# Patient Record
(demographics unavailable — no encounter records)

---

## 2025-02-28 NOTE — HISTORY OF PRESENT ILLNESS
[FreeTextEntry1] : 57 YO M seen TODAY 2/28/2025 as NPT. He was formerly a patient of Dr. Hunter, seen 10/17/2023:         58 yo M with elevated PSA, positive family history of prostate cancer TRUS prostate biopsy 12/2018 negative MRI 2/2019 small < 1cm, PI-RADS 3 lesion (equivocal) [place] [facility] [place] MRI 7/2020, no suspicious lesions PSA remains stable. Repeat today BPH, ED, not satisfied with daily Cialis 5mg Will trial tamsulosin 0.4mg for BPH symptoms Reviewed side effects Recommend trial of sildenafil 100mg for ED Reviewed side effects F/u 3 months. PSA 4.37,  10/12/2022  He comes for "Prostate Issues".

## 2025-03-12 NOTE — HISTORY OF PRESENT ILLNESS
[FreeTextEntry1] : 59 YO M seen TODAY 3/12/2025 as NPT. He cancelled 2/28/2025. He was formerly a patient of Dr. Hunter, seen 10/17/2023:         56 YO M with elevated PSA, positive family history of prostate cancer TRUS prostate biopsy 12/2018 negative MRI 2/2019 small < 1cm, PI-RADS 3 lesion (equivocal) [place] [facility] [place] negative MRI 7/2020, no suspicious lesions PSA remains stable. Repeat today BPH, ED, not satisfied with daily Cialis 5mg Will trial tamsulosin 0.4mg for BPH symptoms Reviewed side effects Recommend trial of sildenafil 100mg for ED Reviewed side effects F/u 3 months. PSA 4.37,  10/12/2022  He comes for further evaluation of a recent higher PSA. He told me that he has had 2 prior biopsies, the last one was in 2020, a Fusion PNB through the rectum. both were negative. This was done after the MRI showed a IA=3 lesion in the left PZ.  He had a repeat MRI 10/4/2024 which showed a IA=3 lesion in the RIGHT PZ. Latest PSA from 12/3/2024 was 9.9.  He also has persistent LUTS and is not taking the tamsulosin as prescribed. He also has chronic ED with difficulty achieving and maintaining erections. He has had intermittent results on PRN sildenafil 100 mg or tadalafil 20 mg. He states that tadalafil 5 mg daily also was not as effective. He has not tried combination therapy.

## 2025-03-12 NOTE — HISTORY OF PRESENT ILLNESS
[FreeTextEntry1] : 57 YO M seen TODAY 3/12/2025 as NPT. He cancelled 2/28/2025. He was formerly a patient of Dr. Hunter, seen 10/17/2023:         58 YO M with elevated PSA, positive family history of prostate cancer TRUS prostate biopsy 12/2018 negative MRI 2/2019 small < 1cm, PI-RADS 3 lesion (equivocal) [place] [facility] [place] was negative MRI 7/2020, no suspicious lesions PSA remains stable. Repeat today BPH, ED, not satisfied with daily Cialis 5mg Will trial tamsulosin 0.4mg for BPH symptoms Reviewed side effects Recommend trial of sildenafil 100mg for ED Reviewed side effects F/u 3 months. PSA 4.37,  10/12/2022  He comes for further evaluation of a recent higher PSA. He told me that he has had 2 prior biopsies, the last one was in 2020, a Fusion PNB through the rectum. both were negative. This was done after the MRI showed a TX=3 lesion in the left PZ.  He had a repeat MRI 10/4/2024 which showed a TX=3 lesion in the RIGHT PZ. Latest PSA from 12/3/2024 was 9.9.  He also has persistent LUTS and is not taking the tamsulosin as prescribed. He also has chronic ED with difficulty achieving and maintaining erections. He has had intermittent results on PRN sildenafil 100 mg or tadalafil 20 mg. He states that tadalafil 5 mg daily also was not as effective. He has not tried combination therapy.

## 2025-03-19 NOTE — HISTORY OF PRESENT ILLNESS
[Home] : at home, [unfilled] , at the time of the visit. [Medical Office: ([facility])___] : at the medical office located in  [This encounter was initiated by telehealth (audio with video) and converted to telephone (audio only)] : This encounter was initiated by telehealth (audio with video) and converted to telephone (audio only) [Technical] : patient unable to effectively utilize tele-video due to technical issues. [Verbal consent obtained from patient] : the patient, [unfilled] [FreeTextEntry1] : Language: *** Date of First visit: *** Accompanied by: *** Contact info: *** Referring Provider/PCP:    Dr Mcmillan   CC/ Problem List:   =============================================================================== FIRST VISIT:  The patient's first visit with [facility] urology was on 2018 when the patient was 52 years old.  He was followed by Dr Hunter until his departure in . He then saw Dr Mcmillan. He has had an elevated/fluctuating PSA for years. He has had three MRIs. He has had two biopsies, both negative.    ------------------------------------------------------------------------------------------- INTERVAL VISITS: The patient presents as an RDP referral on 3/19/25 at 58 years old. Please note interval events and changes in PMH, PSH, MEDS and ALLERGIES were reviewed. His PSA is 7.22ng/ml. He has had three prostate MRIs. Most recent MRI demonstrated an 11mm PIRADS 3 lesion (right posterior midgland). His PSA density is elevated (0.18 ng/ml/cc). He has had two negative biopsies, both in 2019. One was with Dr Hunter, the 2nd was in 2019 at [facility] [place] and was fusion.  His father was diagnosed with prostate cancer in his 60s and passed away shortly after from the disease.  Experiencing moderate LUTS on daily tadalafil. He states he is satisfied with his symptoms on this medication.  His PVR at most recent visit was 61cc. IPSS 9 QOL 4    ===============================================================================   PMH: No chronic medical problems PSH: Cervical disc POBH: (if applicable) FH: Father metastatic prostate cancer, mother lung cancer   ALL: NKDA MEDS: Tadalafil 5mg SOC: Former smoker 1ppd [id number], quit 30 years ago      ROS: Review of Systems is as per HPI unless otherwise denoted below     =============================================================================== DATA:   LABS:------------------------------------------------------------------------------------------------------------------- 10/1/18: PSA 6.11 10/23/18: PSA 6.83 18: PSA 7.29 2020: PSA 6.91 2021: PSA 6.10 10/20/21: PSA 5.46 10/10/22: PSA 4.37 2024: 9.9 per pt 3/12/2025: PSA 7.22    RADS:------------------------------------------------------------------------------------------------------------------- 2/15/2019 MRI prostate at [facility] 29cc prostate PIRADS 3 9mm left mid pzpl No LAD EPE or bony lesions  2020 MRI prostate at [facility] 32cc prostate No suspicious lesions, PIRADS 2  10/4/2024 MRI prostate at [facility] 38cc prostate 11mm PIRADS 3 lesion, right posterior midgland No LAD EPE or bony lesions    PATHOLOGY/CYTOLOGY:------------------------------------------------------------------------------------------- 2019  - prostate biopsy with Dr Hunter  cores BPH     VOIDING STUDIES: ---------------------------------------------------------------------------------------------------- 3/12/25: PVR 61cc      STONE STUDIES: (Analysis/LLSA)----------------------------------------------------------------------------------       PROCEDURES: -----------------------------------------------------------------------------------------------         ===============================================================================     PHYSICAL EXAM:   GEN: AAOx3, NAD   BARRIERS to CARE: None   PSYCH: Appropriate Behavior, Affect Congruent   FOCUSED: -------------------------------------------------------------------------------------------------   Prostate: (date 3/12/25 with Dr Mcmillan)  Smooth. No nodules.  =======================================================================================       ASSESSMENT and PLAN   The patient is a 58 year old male with a history of the followin. Elevated PSA/PSAD - Need MRI images uploaded to PACS prior to his RDR visit- RDP team aware - He is hesitant to book a 3rd biopsy. We discussed his risk factors -- PSA/PSAD, lesion on MRI, and family hx. - Offered TP biopsy vs select MDx as a tie breaker. He knows this is a test for clinically significant prostate cancer. He would like to proceed with a select MDx before deciding on a biopsy.  Prostate cancer screening: the patient and I spoke at length about prostate cancer screening, its risks and its benefits.  He  understands that PSA in and of itself does not diagnose prostate cancer but only assesses risk to a certain degree. We discussed the implications of an elevated PSA including the fact that the PSA level may be affected by various factors including age, prostate size, ethnicity, use of medications, and concurrent prostatic inflammation. Serum PSA is generally proportional to the risk of prostate cancer but there is no specific PSA cut-off signifying prostate cancer. The patient understands that to definitively screen for prostate cancer, a biopsy is required.  -----------------------------------------------------------------------------------------------------   LABS/TESTS Ordered:   Meds Ordered: Cont daily tadalafil   Follow up: Dr Lee for Select MDx   -----------------------------------------------------------------------------------------------------      Thank you for allowing me to participate in your patient's care. Please feel free to contact me with any questions.   Sincerely,   Carla Zuluaga, MS, ANP-C Senior Nurse Practitioner [facility] [place] [address] [place] [address] Tel: [phone]

## 2025-04-15 NOTE — HISTORY OF PRESENT ILLNESS
[FreeTextEntry1] : Language: English Date of First visit: 04/15/2025 Accompanied by: *** Contact info: *** Referring Provider/PCP:        CC/ Problem List:   =============================================================================== FIRST VISIT: The patient is a 58 year male who first presents on 04/15/2025 for an elevated PSA.   He has had three prostate MRIs. Most recent MRI demonstrated an 11mm PIRADS 3 lesion (right posterior midgland). His PSA density is elevated (0.18 ng/ml/cc). He has had two negative biopsies, both in 2019. One was with Dr Hunter, [place] [facility] [place]. His father was diagnosed with prostate cancer in his 60s and passed away shortly after from the disease.  ------------------------------------------------------------------------------------------- INTERVAL VISITS:  The patient's age today 04/15/2025  is 58 year old. Please note interval events and changes in PMH, PSH, MEDS and ALLERGIES were reviewed. He feels fine and is still quite hesitant to do another biopsy. He states he had two biopsies in 2019 but one was without fusion because his insurance would not pay for it. He otherwise feels well. I do not have his films yet from his most recent scan.    ===============================================================================   PMH: None PSH: Cervical disc POBH: (if applicable) FH:   ALL: NKDA MEDS: Tadalafil SOC: Former smoker 1ppd x2 years, quit age 29yo     ROS: Review of Systems is as per HPI unless otherwise denoted below     =============================================================================== DATA:   LABS:-------------------------------------------------------------------------------------------------------------------  10/1/18: PSA 6.11 10/23/18: PSA 6.83 18: PSA 7.29 2020: PSA 6.91 2021: PSA 6.10 10/20/21: PSA 5.46 10/10/22: PSA 4.37 2024: 9.9 per pt 3/12/2025: PSA 7.22     RADS:-------------------------------------------------------------------------------------------------------------------  2/15/2019 MRI prostate at [facility] 29cc prostate PIRADS 3 9mm left mid pzpl No LAD EPE or bony lesions  2020 MRI prostate at [facility] 32cc prostate No suspicious lesions, PIRADS 2  10/4/2024 MRI prostate at [facility] 38cc prostate 11mm PIRADS 3 lesion, right posterior midgland No LAD EPE or bony lesions     PATHOLOGY/CYTOLOGY:-------------------------------------------------------------------------------------------  2019 - prostate biopsy with Dr Hunter  cores BPH     VOIDING STUDIES: ----------------------------------------------------------------------------------------------------       STONE STUDIES: (Analysis/LLSA)----------------------------------------------------------------------------------       PROCEDURES: -----------------------------------------------------------------------------------------------         ===============================================================================    PHYSICAL EXAM:  GEN: AAOx3, NAD; Habitus: normal  BARRIERS to CARE: none  PSYCH: Appropriate Behavior, Affect Congruent  HEENT: AT/NC Trachea midline. EOMI.  Lungs: No labored breathing.  NEURO: + Movement, all 4 extremities grossly intact without deficits. No tremors.  SKIN: Warm dry. No visible rashes or ulcers  GAIT: Gait normal, Stability good  CAN: done Aby Bonilla RN was chaperone CAN: normal rectum, normal tone 40-50gm prostate, no nodules  =======================================================================================      ASSESSMENT and PLAN   The patient is a 58 year male with a history of the followin. Elevated but fairly stable PSA s/p two negative biopsies in 2019 with new PIRADS 3 lesion on MRI (waiting for films to be scanned) I will review his films once they are scanned but in the meantime we sent a Select MDX. WE will do a Video visit in 2 weeks to review all of the above.       -----------------------------------------------------------------------------------------------------   LABS/TESTS Ordered: Select MDX, Review films when scanned   Meds Ordered:   Follow up: VV 2 weeks   -----------------------------------------------------------------------------------------------------    The total amount of time I have personally spent preparing for this visit, reviewing the patient's test results, obtaining external history, ordering tests/medications, documenting clinical information, communicating with and counseling the patient/family and/or caregiver(s), and spent face to face with the patient explaining the above was 35 minutes.  Thank you for allowing me to participate in your patient's care. Please feel free to contact me with any questions.   Alyssa Lee MD [facility] Department of Urology   [address] [place] [id number] P: [phone]; F: [phone]

## 2025-04-15 NOTE — HISTORY OF PRESENT ILLNESS
[FreeTextEntry1] : Language: English Date of First visit: 04/15/2025 Accompanied by: *** Contact info: *** Referring Provider/PCP:        CC/ Problem List:   =============================================================================== FIRST VISIT: The patient is a 58 year male who first presents on 04/15/2025 for an elevated PSA.   He has had three prostate MRIs. Most recent MRI demonstrated an 11mm PIRADS 3 lesion (right posterior midgland). His PSA density is elevated (0.18 ng/ml/cc). He has had two negative biopsies, both in 2019. One was with Dr Hunter, the 2nd was in 2019 at [facility] [place] and was fusion. His father was diagnosed with prostate cancer in his 60s and passed away shortly after from the disease.  ------------------------------------------------------------------------------------------- INTERVAL VISITS:  The patient's age today 04/15/2025  is 58 year old. Please note interval events and changes in PMH, PSH, MEDS and ALLERGIES were reviewed. He feels fine and is still quite hesitant to do another biopsy. He states he had two biopsies in 2019 but one was without fusion because his insurance would not pay for it. He otherwise feels well. I do not have his films yet from his most recent scan.    ===============================================================================   PMH: None PSH: Cervical disc POBH: (if applicable) FH:   ALL: NKDA MEDS: Tadalafil SOC: Former smoker 1ppd x2 years, quit age 27yo     ROS: Review of Systems is as per HPI unless otherwise denoted below     =============================================================================== DATA:   LABS:-------------------------------------------------------------------------------------------------------------------  10/1/18: PSA 6.11 10/23/18: PSA 6.83 18: PSA 7.29 2020: PSA 6.91 2021: PSA 6.10 10/20/21: PSA 5.46 10/10/22: PSA 4.37 2024: 9.9 per pt 3/12/2025: PSA 7.22     RADS:-------------------------------------------------------------------------------------------------------------------  2/15/2019 MRI prostate at [facility] 29cc prostate PIRADS 3 9mm left mid pzpl No LAD EPE or bony lesions  2020 MRI prostate at [facility] 32cc prostate No suspicious lesions, PIRADS 2  10/4/2024 MRI prostate at [facility] 38cc prostate 11mm PIRADS 3 lesion, right posterior midgland No LAD EPE or bony lesions     PATHOLOGY/CYTOLOGY:-------------------------------------------------------------------------------------------  2019 - prostate biopsy with Dr Hunter  cores BPH     VOIDING STUDIES: ----------------------------------------------------------------------------------------------------       STONE STUDIES: (Analysis/LLSA)----------------------------------------------------------------------------------       PROCEDURES: -----------------------------------------------------------------------------------------------         ===============================================================================    PHYSICAL EXAM:  GEN: AAOx3, NAD; Habitus: normal  BARRIERS to CARE: none  PSYCH: Appropriate Behavior, Affect Congruent  HEENT: AT/NC Trachea midline. EOMI.  Lungs: No labored breathing.  NEURO: + Movement, all 4 extremities grossly intact without deficits. No tremors.  SKIN: Warm dry. No visible rashes or ulcers  GAIT: Gait normal, Stability good  CAN: done Aby Bonilla RN was chaperone CAN: normal rectum, normal tone 40-50gm prostate, no nodules  =======================================================================================      ASSESSMENT and PLAN   The patient is a 58 year male with a history of the followin. Elevated but fairly stable PSA s/p two negative biopsies in 2019 with new PIRADS 3 lesion on MRI (waiting for films to be scanned) I will review his films once they are scanned but in the meantime we sent a Select MDX. WE will do a Video visit in 2 weeks to review all of the above.       -----------------------------------------------------------------------------------------------------   LABS/TESTS Ordered: Select MDX, Review films when scanned   Meds Ordered:   Follow up: VV 2 weeks   -----------------------------------------------------------------------------------------------------    The total amount of time I have personally spent preparing for this visit, reviewing the patient's test results, obtaining external history, ordering tests/medications, documenting clinical information, communicating with and counseling the patient/family and/or caregiver(s), and spent face to face with the patient explaining the above was 35 minutes.  Thank you for allowing me to participate in your patient's care. Please feel free to contact me with any questions.   Alyssa Lee MD [facility] Department of Urology   [address] [place] [id number] P: [phone]; F: [phone]

## 2025-05-01 NOTE — HISTORY OF PRESENT ILLNESS
[FreeTextEntry1] : Language: English Date of First visit: 04/15/2025 Accompanied by: *** Contact info: *** Referring Provider/PCP:        CC/ Problem List:   =============================================================================== FIRST VISIT: The patient is a 58 year male who first presents on 04/15/2025 for an elevated PSA.   He has had three prostate MRIs. Most recent MRI demonstrated an 11mm PIRADS 3 lesion (right posterior midgland). His PSA density is elevated (0.18 ng/ml/cc). He has had two negative biopsies, both in 2019. One was with Dr Hunter, the 2nd was in 2019 at [facility] and was fusion. His father was diagnosed with prostate cancer in his 60s and passed away shortly after from the disease.  ------------------------------------------------------------------------------------------- INTERVAL VISITS:  He feels fine and is still quite hesitant to do another biopsy. He states he had two biopsies in 2019 but one was without fusion because his insurance would not pay for it.  The patient's age today 2025  is 58 year old. Please note interval events and changes in PMH, PSH, MEDS and ALLERGIES were reviewed. This visit was held via telehealth using a HIPAA compliant two way Audiovisual platform The patient was located: [place] The provider was in the office. Participants: Patient JANELLE SANDERSON , Alyssa Lee MD No other participants were present  We did a Select MDX in 4/15/2025 and it showed a 54% chance of any cancer and a 26% chance of GG2 or higher cancer. He otherwise feels well.    ===============================================================================   PMH: None PSH: Cervical disc POBH: (if applicable) FH:   ALL: NKDA MEDS: Tadalafil SOC: Former smoker 1ppd x2 years, quit age 29yo     ROS: Review of Systems is as per HPI unless otherwise denoted below     =============================================================================== DATA:   LABS:-------------------------------------------------------------------------------------------------------------------  10/1/18: PSA 6.11 10/23/18: PSA 6.83 18: PSA 7.29 2020: PSA 6.91 2021: PSA 6.10 10/20/21: PSA 5.46 10/10/22: PSA 4.37 2024: 9.9 per pt 3/12/2025: PSA 7.22  4/15/2025 Select  MDX showed a 54% chance of any cancer and a 26% chance of GG2 or higher cancer.   RADS:-------------------------------------------------------------------------------------------------------------------  2/15/2019 MRI prostate at [facility] 29cc prostate PIRADS 3 9mm left mid pzpl No LAD EPE or bony lesions  2020 MRI prostate at [facility] 32cc prostate No suspicious lesions, PIRADS 2  10/4/2024 MRI prostate at [facility] 38cc prostate 11mm PIRADS 3 lesion, right posterior midgland No LAD EPE or bony lesions  Films personally reviewed and I agree there is a right mid PZPM lesion that could/should be biopsied     PATHOLOGY/CYTOLOGY:-------------------------------------------------------------------------------------------  2019 - prostate biopsy with Dr Hunter  cores BPH     VOIDING STUDIES: ----------------------------------------------------------------------------------------------------       STONE STUDIES: (Analysis/LLSA)----------------------------------------------------------------------------------       PROCEDURES: -----------------------------------------------------------------------------------------------         ===============================================================================    PHYSICAL EXAM:  GEN: AAOx3, NAD; Habitus: normal  BARRIERS to CARE: none  PSYCH: Appropriate Behavior, Affect Congruent  HEENT: AT/NC Trachea midline. EOMI.    CAN: done at prior visit 4/15/2025 Aby Bonilla RN was chaperone CAN: normal rectum, normal tone 40-50gm prostate, no nodules  =======================================================================================      ASSESSMENT and PLAN   The patient is a 58 year male with a history of the followin. Elevated but fairly stable PSA s/p two negative biopsies in  with new PIRADS 3 lesion on MRI (waiting for films to be scanned) His select MDX showed a 26% chance of a GG2 and his 2024 MRI showed a new PIRADS 3 lesion in the right mid PZPM. I reviewed his films and agree. He is clearly not ready for a prostate biopsy (mentally). We therefore decided that he will do a prostate MRI in Aug/Sept and we will see him in person after to review and do a repeat PSA.     -----------------------------------------------------------------------------------------------------   LABS/TESTS Ordered: Prostate MRI Aug/Sept 2025   Meds Ordered:   Follow up: after MRI for PSA, PVR and in person visit/film review   -----------------------------------------------------------------------------------------------------    The total amount of time I have personally spent preparing for this visit, reviewing the patient's test results, obtaining external history, ordering tests/medications, documenting clinical information, communicating with and counseling the patient/family and/or caregiver(s), and spent face to face with the patient explaining the above was 25 minutes.  Thank you for allowing me to participate in your patient's care. Please feel free to contact me with any questions.   Alyssa Lee MD [facility] Department of Urology   [address] [place] [id number] P: [phone]; F: [phone]

## 2025-05-01 NOTE — HISTORY OF PRESENT ILLNESS
[FreeTextEntry1] : Language: English Date of First visit: 04/15/2025 Accompanied by: *** Contact info: *** Referring Provider/PCP:        CC/ Problem List:   =============================================================================== FIRST VISIT: The patient is a 58 year male who first presents on 04/15/2025 for an elevated PSA.   He has had three prostate MRIs. Most recent MRI demonstrated an 11mm PIRADS 3 lesion (right posterior midgland). His PSA density is elevated (0.18 ng/ml/cc). He has had two negative biopsies, both in 2019. One was with Dr Hunter, the 2nd was in 2019 at [facility] and was fusion. His father was diagnosed with prostate cancer in his 60s and passed away shortly after from the disease.  ------------------------------------------------------------------------------------------- INTERVAL VISITS:  He feels fine and is still quite hesitant to do another biopsy. He states he had two biopsies in 2019 but one was without fusion because his insurance would not pay for it.  The patient's age today 2025  is 58 year old. Please note interval events and changes in PMH, PSH, MEDS and ALLERGIES were reviewed. This visit was held via telehealth using a HIPAA compliant two way Audiovisual platform The patient was located: [place] The provider was in the office. Participants: Patient JANELLE SANDERSON , Alyssa Lee MD No other participants were present  We did a Select MDX in 4/15/2025 and it showed a 54% chance of any cancer and a 26% chance of GG2 or higher cancer. He otherwise feels well.    ===============================================================================   PMH: None PSH: Cervical disc POBH: (if applicable) FH:   ALL: NKDA MEDS: Tadalafil SOC: Former smoker 1ppd x2 years, quit age 27yo     ROS: Review of Systems is as per HPI unless otherwise denoted below     =============================================================================== DATA:   LABS:-------------------------------------------------------------------------------------------------------------------  10/1/18: PSA 6.11 10/23/18: PSA 6.83 18: PSA 7.29 2020: PSA 6.91 2021: PSA 6.10 10/20/21: PSA 5.46 10/10/22: PSA 4.37 2024: 9.9 per pt 3/12/2025: PSA 7.22  4/15/2025 Select  MDX showed a 54% chance of any cancer and a 26% chance of GG2 or higher cancer.   RADS:-------------------------------------------------------------------------------------------------------------------  2/15/2019 MRI prostate at [facility] 29cc prostate PIRADS 3 9mm left mid pzpl No LAD EPE or bony lesions  2020 MRI prostate at [facility] 32cc prostate No suspicious lesions, PIRADS 2  10/4/2024 MRI prostate at [facility] 38cc prostate 11mm PIRADS 3 lesion, right posterior midgland No LAD EPE or bony lesions  Films personally reviewed and I agree there is a right mid PZPM lesion that could/should be biopsied     PATHOLOGY/CYTOLOGY:-------------------------------------------------------------------------------------------  2019 - prostate biopsy with Dr Hunter  cores BPH     VOIDING STUDIES: ----------------------------------------------------------------------------------------------------       STONE STUDIES: (Analysis/LLSA)----------------------------------------------------------------------------------       PROCEDURES: -----------------------------------------------------------------------------------------------         ===============================================================================    PHYSICAL EXAM:  GEN: AAOx3, NAD; Habitus: normal  BARRIERS to CARE: none  PSYCH: Appropriate Behavior, Affect Congruent  HEENT: AT/NC Trachea midline. EOMI.    CAN: done at prior visit 4/15/2025 Aby Bonilla RN was chaperone CAN: normal rectum, normal tone 40-50gm prostate, no nodules  =======================================================================================      ASSESSMENT and PLAN   The patient is a 58 year male with a history of the followin. Elevated but fairly stable PSA s/p two negative biopsies in  with new PIRADS 3 lesion on MRI (waiting for films to be scanned) His select MDX showed a 26% chance of a GG2 and his 2024 MRI showed a new PIRADS 3 lesion in the right mid PZPM. I reviewed his films and agree. He is clearly not ready for a prostate biopsy (mentally). We therefore decided that he will do a prostate MRI in Aug/Sept and we will see him in person after to review and do a repeat PSA.     -----------------------------------------------------------------------------------------------------   LABS/TESTS Ordered: Prostate MRI Aug/Sept 2025   Meds Ordered:   Follow up: after MRI for PSA, PVR and in person visit/film review   -----------------------------------------------------------------------------------------------------    The total amount of time I have personally spent preparing for this visit, reviewing the patient's test results, obtaining external history, ordering tests/medications, documenting clinical information, communicating with and counseling the patient/family and/or caregiver(s), and spent face to face with the patient explaining the above was 25 minutes.  Thank you for allowing me to participate in your patient's care. Please feel free to contact me with any questions.   Alyssa Lee MD [facility] Department of Urology   [address] [place] [id number] P: [phone]; F: [phone]